# Patient Record
Sex: MALE | Race: BLACK OR AFRICAN AMERICAN
[De-identification: names, ages, dates, MRNs, and addresses within clinical notes are randomized per-mention and may not be internally consistent; named-entity substitution may affect disease eponyms.]

---

## 2019-07-08 ENCOUNTER — HOSPITAL ENCOUNTER (EMERGENCY)
Dept: HOSPITAL 25 - UCCORT | Age: 59
Discharge: HOME | End: 2019-07-08
Payer: COMMERCIAL

## 2019-07-08 VITALS — DIASTOLIC BLOOD PRESSURE: 79 MMHG | SYSTOLIC BLOOD PRESSURE: 141 MMHG

## 2019-07-08 DIAGNOSIS — R21: Primary | ICD-10-CM

## 2019-07-08 PROCEDURE — G0463 HOSPITAL OUTPT CLINIC VISIT: HCPCS

## 2019-07-08 PROCEDURE — 99212 OFFICE O/P EST SF 10 MIN: CPT

## 2019-07-08 NOTE — UC
General HPI





- HPI Summary


HPI Summary: 





pt noted some itching to his R lower eye lid 2 days ago which he rubbed.


over the past day, the area has developed some bumps and swelling as well.


he notes it is sore to touch.


he cleaned it with peroxide and applied Neosporin.


He has no eye pain, redness or discharge.





- History of Current Complaint


Chief Complaint: UCEye


Stated Complaint: RIGHT CHEEK SKIN CONCERN


Time Seen by Provider: 07/08/19 21:13


Hx Obtained From: Patient


Onset/Duration: Gradual Onset


Timing: Constant


Pain Intensity: 3


Associated Signs & Symptoms: Negative: Fever, Headache





- Allergy/Home Medications


Allergies/Adverse Reactions: 


 Allergies











Allergy/AdvReac Type Severity Reaction Status Date / Time


 


wilian Allergy  Swelling Verified 07/08/19 21:04














PMH/Surg Hx/FS Hx/Imm Hx


Previously Healthy: Yes





- Surgical History


Surgical History: Yes


Surgery Procedure, Year, and Place: 2006--Left rotator cuff.  2007--Right 

rotator cuff





- Family History


Known Family History: Positive: Non-Contributory


   Negative: Cardiac Disease





- Social History


Alcohol Use: None


Substance Use Type: None


Smoking Status (MU): Never Smoked Tobacco





Review of Systems


All Other Systems Reviewed And Are Negative: No


Constitutional: Negative: Fever, Chills


Skin: Positive: Rash


Eyes: Negative: Blurred Vision, Diplopia, Drainage, Eye Redness, Photophobia


ENT: Negative: Sore Throat, Ear Ache, Nasal Discharge


Neurological: Negative: Headache





Physical Exam


Triage Information Reviewed: Yes


Appearance: Well-Appearing


Vital Signs: 


 Initial Vital Signs











Temp  97.8 F   07/08/19 21:06


 


Pulse  67   07/08/19 21:06


 


Resp  16   07/08/19 21:06


 


BP  141/79   07/08/19 21:06


 


Pulse Ox  99   07/08/19 21:06











Vital Signs Reviewed: Yes


Eyes: Positive: Other: - No auricular adenopathy. R lower lid has about 12 tiny 

bumps with slight swelling but no erythema or vesicles. He notes it is tender 

to touch. No additional periorbital rash or edema to either eye. Conjunctiva 

are clear. AC's clear. PERRL, EOMI. Lids everted and unremearkable.


ENT: Positive: Pharynx normal, TMs normal.  Negative: Nasal congestion, Nasal 

drainage


Neck: Positive: Supple, Nontender, No Lymphadenopathy


Musculoskeletal: Positive: ROM Intact


Neurological: Positive: Alert


Psychological: Positive: Age Appropriate Behavior


Skin Exam: Normal





Course/Dx





- Course


Course Of Treatment: 





case d/w Dr Metcalf who examined the rash and suggest tx with an antibiotic 

and f/u with Dr Porter, his opthamology tomorrow.





- Differential Dx - Multi-Symptom


Differential Diagnoses: Other - no c/w orbital cellulitis or shingles.





- Diagnoses


Provider Diagnosis: 


 Rash








Discharge





- Sign-Out/Discharge


Documenting (check all that apply): Patient Departure


All imaging exams completed and their final reports reviewed: No Studies





- Discharge Plan


Condition: Stable


Disposition: HOME


Prescriptions: 


Cephalexin CAP* [Keflex CAP*] 500 mg PO TID 7 Days #21 cap


Patient Education Materials:  Acute Rash (ED)


Referrals: 


Corky Porter MD [Medical Doctor] - 1 Day


Additional Instructions: 


STOP THE TOPICAL ANTIBIOTIC.





- Billing Disposition and Condition


Condition: STABLE


Disposition: Home